# Patient Record
Sex: MALE | Race: BLACK OR AFRICAN AMERICAN | NOT HISPANIC OR LATINO | URBAN - METROPOLITAN AREA
[De-identification: names, ages, dates, MRNs, and addresses within clinical notes are randomized per-mention and may not be internally consistent; named-entity substitution may affect disease eponyms.]

---

## 2017-02-22 ENCOUNTER — TELEMEDICINE2 (OUTPATIENT)
Dept: NEPHROLOGY | Facility: MEDICAL CENTER | Age: 62
End: 2017-02-22
Payer: COMMERCIAL

## 2017-02-22 VITALS
TEMPERATURE: 97.7 F | RESPIRATION RATE: 18 BRPM | BODY MASS INDEX: 40.43 KG/M2 | HEIGHT: 74 IN | OXYGEN SATURATION: 100 % | HEART RATE: 60 BPM | DIASTOLIC BLOOD PRESSURE: 76 MMHG | SYSTOLIC BLOOD PRESSURE: 141 MMHG | WEIGHT: 315 LBS

## 2017-02-22 DIAGNOSIS — E11.22 TYPE 2 DIABETES MELLITUS WITH STAGE 3 CHRONIC KIDNEY DISEASE, WITHOUT LONG-TERM CURRENT USE OF INSULIN (HCC): ICD-10-CM

## 2017-02-22 DIAGNOSIS — E79.0 HYPERURICEMIA: ICD-10-CM

## 2017-02-22 DIAGNOSIS — E55.9 VITAMIN D DEFICIENCY DISEASE: ICD-10-CM

## 2017-02-22 DIAGNOSIS — N18.30 TYPE 2 DIABETES MELLITUS WITH STAGE 3 CHRONIC KIDNEY DISEASE, WITHOUT LONG-TERM CURRENT USE OF INSULIN (HCC): ICD-10-CM

## 2017-02-22 DIAGNOSIS — N18.30 CHRONIC KIDNEY DISEASE (CKD), STAGE III (MODERATE) (HCC): ICD-10-CM

## 2017-02-22 DIAGNOSIS — N25.81 SECONDARY HYPERPARATHYROIDISM OF RENAL ORIGIN (HCC): ICD-10-CM

## 2017-02-22 PROCEDURE — 99213 OFFICE O/P EST LOW 20 MIN: CPT | Mod: GT | Performed by: INTERNAL MEDICINE

## 2017-02-22 NOTE — MR AVS SNAPSHOT
"        Sánchez Aguirre   2017 10:00 AM   Telemedicine2   MRN: [id number]    Department:  [facility]   Dept Phone:  [phone]    Description:  Male : 1955   Provider:  Herminia Roberts M.D.; TELEMED CD[facility]; [facility] MD           Reason for Visit     Follow-Up           Allergies as of 2017     No Known Allergies      You were diagnosed with     Chronic kidney disease (CKD), stage III (moderate)   [548927]       Hyperuricemia   [044317]       Secondary hyperparathyroidism of renal origin (CMS-HCC)   [253564]       Vitamin D deficiency disease   [217605]       Type 2 diabetes mellitus with stage 3 chronic kidney disease, without long-term current use of insulin (CMS-HCC)   [1865741]         Vital Signs     Blood Pressure Pulse Temperature Respirations Height Weight    141/76 mmHg 60 36.5 °C (97.7 °F) 18 1.88 m (6' 2.02\") 193.232 kg (426 lb)    Body Mass Index Oxygen Saturation                54.67 kg/m2 100%          Basic Information     Date Of Birth Sex Race Ethnicity Preferred Language    1955 Male Black or  Non- English      Problem List              ICD-10-CM Priority Class Noted - Resolved    Essential hypertension I10   2016 - Present    Obesity E66.9   2016 - Present    Hyperuricemia E79.0   2016 - Present    Type 2 diabetes mellitus with diabetic chronic kidney disease (CMS-HCC) E11.22   2016 - Present    Secondary hyperparathyroidism of renal origin (CMS-HCC) N25.81   10/21/2016 - Present      Health Maintenance        Date Due Completion Dates    A1C SCREENING 1955 ---    DIABETES MONOFILAMENT / LE EXAM 1955 ---    RETINAL SCREENING 1972 ---    FASTING LIPID PROFILE 1972 ---    URINE ACR / MICROALBUMIN 1972 ---    SERUM CREATININE 1972 ---    IMM DTaP/Tdap/Td Vaccine (1 - Tdap) 1973 ---    IMM PNEUMOCOCCAL 19-64 (ADULT) HIGHEST RISK SERIES (1 of 3 - PCV13) 1973 ---    COLONOSCOPY 2004 ---  "    IMM ZOSTER VACCINE 2/6/2014 ---    IMM INFLUENZA (1) 9/1/2016 ---            Current Immunizations     No immunizations on file.      Below and/or attached are the medications your provider expects you to take. Review all of your home medications and newly ordered medications with your provider and/or pharmacist. Follow medication instructions as directed by your provider and/or pharmacist. Please keep your medication list with you and share with your provider. Update the information when medications are discontinued, doses are changed, or new medications (including over-the-counter products) are added; and carry medication information at all times in the event of emergency situations     Allergies:  No Known Allergies          Medications  Valid as of: February 22, 2017 - 11:58 AM    Generic Name Brand Name Tablet Size Instructions for use    Allopurinol   Take 100 mg by mouth.        AmLODIPine Besylate (Tab) NORVASC 10 MG Take 10 mg by mouth every day.        Aspirin   Take  by mouth.        Atenolol (Tab) TENORMIN 25 MG Take 25 mg by mouth every day.        Atorvastatin Calcium (Tab) LIPITOR 10 MG Take 40 mg by mouth every evening.        Colchicine (Tab) COLCRYS 0.6 MG Take 0.6 mg by mouth every day.        Doxazosin Mesylate (Tab) CARDURA 1 MG Take 1 mg by mouth every day.        GlipiZIDE (Tab) GLUCOTROL 10 MG Take 10 mg by mouth 2 times a day.        HydroCHLOROthiazide (Tab) HYDRODIURIL 25 MG Take 25 mg by mouth every day.        Levalbuterol HCl (Nebu Soln) XOPENEX 1.25 MG/0.5ML 1.25 mg by Nebulization route every four hours as needed.        Lisinopril (Tab) PRINIVIL 2.5 MG Take 2.5 mg by mouth every day.        .                 Medicines prescribed today were sent to:     None      Medication refill instructions:       If your prescription bottle indicates you have medication refills left, it is not necessary to call your provider’s office. Please contact your pharmacy and they will refill your  medication.    If your prescription bottle indicates you do not have any refills left, you may request refills at any time through one of the following ways: The online [email or web address] system (except Urgent Care), by calling your provider’s office, or by asking your pharmacy to contact your provider’s office with a refill request. Medication refills are processed only during regular business hours and may not be available until the next business day. Your provider may request additional information or to have a follow-up visit with you prior to refilling your medication.   *Please Note: Medication refills are assigned a new Rx number when refilled electronically. Your pharmacy may indicate that no refills were authorized even though a new prescription for the same medication is available at the pharmacy. Please request the medicine by name with the pharmacy before contacting your provider for a refill.           [email or web address] Access Code: [email or web address]  Expires: 3/24/2017 11:58 AM    [email or web address]  A secure, online tool to manage your health information     [email or web address]’s [email or web address]® is a secure, online tool that connects you to your personalized health information from the privacy of your home -- day or night - making it very easy for you to manage your healthcare. Once the activation process is completed, you can even access your medical information using the [email or web address]com justina, which is available for free in the Apple Justina store or Google Play store.     [email or web address][employer]com provides the following levels of access (as shown below):   My Chart Features   [facility] Doctor [facility]  Urgent  Care [facility]  Primary Care  Doctor   Email your healthcare team securely and privately 24/7 X X X    Manage appointments: schedule your next appointment; view details of past/upcoming appointments X      Request prescription refills. X      View recent personal medical records, including lab and immunizations X X X X   View health  record, including health history, allergies, medications X X X X   Read reports about your outpatient visits, procedures, consult and ER notes X X X X   See your discharge summary, which is a recap of your hospital and/or ER visit that includes your diagnosis, lab results, and care plan. X X       How to register for [employer]:  1. Go to  [email or web address].  2. Click on the Sign Up Now box, which takes you to the New Member Sign Up page. You will need to provide the following information:  a. Enter your Executive Channel Access Code exactly as it appears at the top of this page. (You will not need to use this code after you’ve completed the sign-up process. If you do not sign up before the expiration date, you must request a new code.)   b. Enter your date of birth.   c. Enter your home email address.   d. Click Submit, and follow the next screen’s instructions.  3. Create a Executive Channel ID. This will be your [employer] Channel login ID and cannot be changed, so think of one that is secure and easy to remember.  4. Create a DLSt password. You can change your password at any time.  5. Enter your Password Reset Question and Answer. This can be used at a later time if you forget your password.   6. Enter your e-mail address. This allows you to receive e-mail notifications when new information is available in [employer].  7. Click Sign Up. You can now view your health information.    For assistance activating your [employer] account, call [phone]

## 2017-02-22 NOTE — PROGRESS NOTES
"Subjective:      Sánchez Aguirre is a 62 y.o. male who presents for  Follow-Up and Chronic Kidney Disease            HPI  62-year-old patient for f/u of CKD II, HTN  Doing well, no complaints  Creatinine better  HTN: BP slightly above the goal          Review of Systems   Constitutional: Negative for fever and chills. No weight loss  HEENT: no HA, no vision change  Cardiovascular: Negative for chest pain and palpitations.   GI: no N/V/abdominal pain  : no dysuria/hematuria/ hesitancy         Objective:     /76 mmHg  Pulse 60  Temp(Src) 36.5 °C (97.7 °F)  Resp 18  Ht 1.88 m (6' 2.02\")  Wt 193.232 kg (426 lb)  BMI 54.67 kg/m2  SpO2 100%     Physical Exam   Constitutional: He is oriented to person, place, and time. He appears well-developed and well-nourished.   Cardiovascular: Normal rate and regular rhythm.    Pulmonary/Chest: Effort normal and breath sounds normal.   Neurological: He is alert and oriented to person, place, and time.   Skin: Skin is warm and dry.               Assessment/Plan:     1. CKD (chronic kidney disease) stage III  Creatinine is better to 1.8 (baseline)    2. Essential hypertension  Blood pressure above the goal    3. Secondary hyperparathyroidism of renal origin (HCC)       To increase vit D dose    4. Type 2 diabetes mellitus with stage 4 chronic kidney disease, unspecified long term insulin use status (HCC)  Blood sugars are still variable.      Recommendations  1. Add vit D3 2000 units daily  2. Lisinopril 40 mg QAM and 20 mg QPM  3. Monitor BP  4. Diet: low Na, diabetic, low cholesterol  5. Follow-up in 4 months, BMP, PTH, vitamin D level, microalbumin to creatinine ratio prior to visit      "

## 2017-06-13 ENCOUNTER — TELEMEDICINE2 (OUTPATIENT)
Dept: NEPHROLOGY | Facility: MEDICAL CENTER | Age: 62
End: 2017-06-13
Payer: COMMERCIAL

## 2017-06-13 VITALS
TEMPERATURE: 97.9 F | WEIGHT: 315 LBS | SYSTOLIC BLOOD PRESSURE: 121 MMHG | DIASTOLIC BLOOD PRESSURE: 63 MMHG | HEIGHT: 74 IN | HEART RATE: 61 BPM | BODY MASS INDEX: 40.43 KG/M2 | RESPIRATION RATE: 16 BRPM | OXYGEN SATURATION: 99 %

## 2017-06-13 DIAGNOSIS — I10 ESSENTIAL HYPERTENSION: ICD-10-CM

## 2017-06-13 DIAGNOSIS — E66.01 MORBID OBESITY, UNSPECIFIED OBESITY TYPE (HCC): ICD-10-CM

## 2017-06-13 DIAGNOSIS — N25.81 HYPERPARATHYROIDISM DUE TO RENAL INSUFFICIENCY (HCC): ICD-10-CM

## 2017-06-13 DIAGNOSIS — N18.4 CKD (CHRONIC KIDNEY DISEASE) STAGE 4, GFR 15-29 ML/MIN (HCC): ICD-10-CM

## 2017-06-13 DIAGNOSIS — N18.4 TYPE 2 DIABETES MELLITUS WITH STAGE 4 CHRONIC KIDNEY DISEASE, UNSPECIFIED LONG TERM INSULIN USE STATUS: ICD-10-CM

## 2017-06-13 DIAGNOSIS — E11.22 TYPE 2 DIABETES MELLITUS WITH STAGE 4 CHRONIC KIDNEY DISEASE, UNSPECIFIED LONG TERM INSULIN USE STATUS: ICD-10-CM

## 2017-06-13 DIAGNOSIS — R60.9 EDEMA, UNSPECIFIED TYPE: ICD-10-CM

## 2017-06-13 PROCEDURE — 99214 OFFICE O/P EST MOD 30 MIN: CPT | Mod: GT | Performed by: INTERNAL MEDICINE

## 2017-06-13 RX ORDER — DORZOLAMIDE HYDROCHLORIDE AND TIMOLOL MALEATE 20; 5 MG/ML; MG/ML
1 SOLUTION/ DROPS OPHTHALMIC 2 TIMES DAILY
COMMUNITY

## 2017-06-13 RX ORDER — BRIMONIDINE TARTRATE 2 MG/ML
1 SOLUTION/ DROPS OPHTHALMIC 3 TIMES DAILY
COMMUNITY

## 2017-06-13 RX ORDER — LATANOPROST 50 UG/ML
1 SOLUTION/ DROPS OPHTHALMIC NIGHTLY
COMMUNITY

## 2017-06-13 ASSESSMENT — ENCOUNTER SYMPTOMS
HYPERTENSION: 1
NAUSEA: 0
VOMITING: 0
CHILLS: 0
SHORTNESS OF BREATH: 0
FEVER: 0
ORTHOPNEA: 0

## 2017-06-13 NOTE — PROGRESS NOTES
"Subjective:      Sánchez Aguirre is a 62 y.o. male who presents with Hypertension and Chronic Kidney Disease    The patient has history of diabetes mellitus type 2, recent A1c was 7.3        Hypertension  This is a chronic problem. The current episode started more than 1 year ago. The problem is unchanged. The problem is controlled. Associated symptoms include peripheral edema. Pertinent negatives include no chest pain, orthopnea or shortness of breath. There are no associated agents to hypertension. Risk factors for coronary artery disease include diabetes mellitus, male gender, obesity and dyslipidemia. Past treatments include beta blockers, calcium channel blockers and ACE inhibitors. The current treatment provides significant improvement. There are no compliance problems.  Hypertensive end-organ damage includes kidney disease. Identifiable causes of hypertension include chronic renal disease.   Chronic Kidney Disease  This is a chronic problem. The current episode started more than 1 year ago. The problem occurs constantly. The problem has been gradually worsening. Pertinent negatives include no chest pain, chills, fever, nausea, urinary symptoms or vomiting.       Review of Systems   Constitutional: Negative for fever and chills.   Respiratory: Negative for shortness of breath.    Cardiovascular: Positive for leg swelling. Negative for chest pain and orthopnea.   Gastrointestinal: Negative for nausea and vomiting.   Genitourinary: Negative for dysuria, urgency and frequency.   All other systems reviewed and are negative.         Objective:     /63 mmHg  Pulse 61  Temp(Src) 36.6 °C (97.9 °F)  Resp 16  Ht 1.88 m (6' 2\")  Wt 197.995 kg (436 lb 8 oz)  BMI 56.02 kg/m2  SpO2 99%     Physical Exam   Constitutional: He is oriented to person, place, and time. He appears well-developed and well-nourished. No distress.   HENT:   Head: Normocephalic and atraumatic.   Right Ear: External ear normal.   Left " Ear: External ear normal.   Nose: Nose normal.   Eyes: EOM are normal. Pupils are equal, round, and reactive to light. Right eye exhibits no discharge. Left eye exhibits no discharge. No scleral icterus.   Neck: Normal range of motion. Neck supple. No JVD present. No tracheal deviation present. No thyromegaly present.   Cardiovascular: Normal rate, regular rhythm and normal heart sounds.  Exam reveals no friction rub.    No murmur heard.  Pulmonary/Chest: Effort normal and breath sounds normal. No respiratory distress. He has no wheezes. He has no rales.   Abdominal: Soft. Bowel sounds are normal. He exhibits no distension and no mass. There is no tenderness. There is no guarding.   Musculoskeletal: He exhibits edema. He exhibits no tenderness.   Lymphadenopathy:     He has no cervical adenopathy.   Neurological: He is alert and oriented to person, place, and time.   Skin: Skin is warm and dry. He is not diaphoretic. No erythema.   Psychiatric: He has a normal mood and affect. His behavior is normal.   Nursing note and vitals reviewed.     exam was done by the help of the nurse at the remote facility.            Assessment/Plan:     1. Essential hypertension  Controlled  Continue low-sodium diet    2. Type 2 diabetes mellitus with stage 4 chronic kidney disease, unspecified long term insulin use status (CMS-HCC)  Continue to optimize status control for hemoglobin A1c below 7%    3. CKD (chronic kidney disease) stage 4, GFR 15-29 ml/min (CMS-HCC)  Creatinine is increasing  Patient has no uremic symptoms  Renal dose all medication  Avoid nephrotoxins like NSAIDs  Check labs in 4 months  Check micral done to creatinine ratio    4. Morbid obesity, unspecified obesity type (CMS-HCC)  Weight management    5. Hyperparathyroidism due to renal insufficiency (CMS-HCC)  Continue vitamin D  Follow up intact PTH    6. Edema  Low-sodium diet

## 2017-11-29 ENCOUNTER — TELEMEDICINE2 (OUTPATIENT)
Dept: NEPHROLOGY | Facility: MEDICAL CENTER | Age: 62
End: 2017-11-29
Payer: COMMERCIAL

## 2017-11-29 VITALS
DIASTOLIC BLOOD PRESSURE: 61 MMHG | SYSTOLIC BLOOD PRESSURE: 131 MMHG | TEMPERATURE: 98.5 F | HEIGHT: 74 IN | RESPIRATION RATE: 18 BRPM | WEIGHT: 315 LBS | BODY MASS INDEX: 40.43 KG/M2 | HEART RATE: 68 BPM | OXYGEN SATURATION: 97 %

## 2017-11-29 DIAGNOSIS — E11.22 TYPE 2 DIABETES MELLITUS WITH STAGE 3 CHRONIC KIDNEY DISEASE, UNSPECIFIED LONG TERM INSULIN USE STATUS: ICD-10-CM

## 2017-11-29 DIAGNOSIS — E55.9 VITAMIN D DEFICIENCY: ICD-10-CM

## 2017-11-29 DIAGNOSIS — N25.81 HYPERPARATHYROIDISM DUE TO RENAL INSUFFICIENCY (HCC): ICD-10-CM

## 2017-11-29 DIAGNOSIS — N18.30 CKD (CHRONIC KIDNEY DISEASE), STAGE III (HCC): ICD-10-CM

## 2017-11-29 DIAGNOSIS — I10 ESSENTIAL HYPERTENSION: ICD-10-CM

## 2017-11-29 DIAGNOSIS — N18.3 TYPE 2 DIABETES MELLITUS WITH STAGE 3 CHRONIC KIDNEY DISEASE, UNSPECIFIED LONG TERM INSULIN USE STATUS: ICD-10-CM

## 2017-11-29 PROCEDURE — 99213 OFFICE O/P EST LOW 20 MIN: CPT | Mod: GT | Performed by: INTERNAL MEDICINE

## 2017-11-29 NOTE — PROGRESS NOTES
"Subjective:      Sánchez Aguirre is a 62 y.o. male who presents for  Establish Care (telemed) and Chronic Kidney Disease            HPI  62-year-old patient for f/u of CKD III, HTN  Doing well, no complaints  Creatinine better from 2.69 to 2.0  HTN: BP well controlled          Review of Systems   Constitutional: Negative for fever and chills. No weight loss  HEENT: no HA, no vision change  Cardiovascular: Negative for chest pain and palpitations.   GI: no N/V/abdominal pain  : no dysuria/hematuria/ hesitancy         Objective:     /61   Pulse 68   Temp 36.9 °C (98.5 °F)   Resp 18   Ht 1.88 m (6' 2\")   Wt (!) 195.5 kg (431 lb)   SpO2 97%   BMI 55.34 kg/m²      Physical Exam  -completed by facility staff  Constitutional: He is oriented to person, place, and time. He appears well-developed and well-nourished.   Cardiovascular: Normal rate and regular rhythm.    Pulmonary/Chest: Effort normal and breath sounds normal.   GI: abdomen soft NT BS (+)  Extremities : no edema  Neurological: He is alert and oriented to person, place, and time.   Skin: Skin is warm and dry.          Laboratory results reviewed:creat from 9/28/17 at 2.0     Assessment/Plan:     1. CKD (chronic kidney disease) stage III  Creatinine better -to monitor closely    2. Essential hypertension  Blood pressure well controlled    3. Secondary hyperparathyroidism of renal origin (HCC)       To monitor    4. Type 2 diabetes mellitus with stage III chronic kidney disease, unspecified long term insulin use status (HCC)  Microalbuminuria well controlled, on ACEi      Recommendations  1. Continue current treatment  2. Avoid nephrotoxic agents, NSAID's  3. Monitor BP  4. Diet: low Na, diabetic, low cholesterol  5. Follow-up in 4 months, BMP, PTH, vitamin D level, microalbumin to creatinine ratio prior to visit      "

## 2018-05-23 ENCOUNTER — TELEMEDICINE2 (OUTPATIENT)
Dept: NEPHROLOGY | Facility: MEDICAL CENTER | Age: 63
End: 2018-05-23
Payer: COMMERCIAL

## 2018-05-23 VITALS
TEMPERATURE: 98.6 F | HEART RATE: 65 BPM | BODY MASS INDEX: 25.99 KG/M2 | DIASTOLIC BLOOD PRESSURE: 82 MMHG | HEIGHT: 74 IN | WEIGHT: 202.5 LBS | SYSTOLIC BLOOD PRESSURE: 132 MMHG | RESPIRATION RATE: 16 BRPM | OXYGEN SATURATION: 98 %

## 2018-05-23 DIAGNOSIS — R80.9 MICROALBUMINURIA DUE TO TYPE 2 DIABETES MELLITUS (HCC): ICD-10-CM

## 2018-05-23 DIAGNOSIS — E11.29 MICROALBUMINURIA DUE TO TYPE 2 DIABETES MELLITUS (HCC): ICD-10-CM

## 2018-05-23 DIAGNOSIS — N25.81 SECONDARY HYPERPARATHYROIDISM OF RENAL ORIGIN (HCC): ICD-10-CM

## 2018-05-23 DIAGNOSIS — I10 ESSENTIAL HYPERTENSION: ICD-10-CM

## 2018-05-23 DIAGNOSIS — E79.0 HYPERURICEMIA: ICD-10-CM

## 2018-05-23 DIAGNOSIS — N18.30 CKD (CHRONIC KIDNEY DISEASE), STAGE III (HCC): ICD-10-CM

## 2018-05-23 PROCEDURE — 99213 OFFICE O/P EST LOW 20 MIN: CPT | Mod: GT | Performed by: INTERNAL MEDICINE
